# Patient Record
Sex: FEMALE | Race: AMERICAN INDIAN OR ALASKA NATIVE | NOT HISPANIC OR LATINO | Employment: UNEMPLOYED | URBAN - METROPOLITAN AREA
[De-identification: names, ages, dates, MRNs, and addresses within clinical notes are randomized per-mention and may not be internally consistent; named-entity substitution may affect disease eponyms.]

---

## 2021-05-06 ENCOUNTER — PATIENT MESSAGE (OUTPATIENT)
Dept: RESEARCH | Facility: HOSPITAL | Age: 51
End: 2021-05-06

## 2022-10-28 PROBLEM — E66.01 MORBID OBESITY: Status: ACTIVE | Noted: 2022-10-28

## 2022-10-28 PROBLEM — I48.19 PERSISTENT ATRIAL FIBRILLATION: Status: ACTIVE | Noted: 2022-10-28

## 2025-05-24 ENCOUNTER — OFFICE VISIT (OUTPATIENT)
Dept: URGENT CARE | Facility: CLINIC | Age: 55
End: 2025-05-24
Payer: MEDICAID

## 2025-05-24 VITALS
BODY MASS INDEX: 50.02 KG/M2 | HEART RATE: 58 BPM | DIASTOLIC BLOOD PRESSURE: 59 MMHG | WEIGHT: 293 LBS | TEMPERATURE: 98 F | HEIGHT: 64 IN | SYSTOLIC BLOOD PRESSURE: 100 MMHG | RESPIRATION RATE: 19 BRPM | OXYGEN SATURATION: 97 %

## 2025-05-24 DIAGNOSIS — S60.221A CONTUSION OF RIGHT HAND, INITIAL ENCOUNTER: ICD-10-CM

## 2025-05-24 DIAGNOSIS — S69.91XA INJURY OF RIGHT HAND, INITIAL ENCOUNTER: Primary | ICD-10-CM

## 2025-05-24 DIAGNOSIS — T78.40XA ALLERGIC REACTION, INITIAL ENCOUNTER: ICD-10-CM

## 2025-05-24 PROCEDURE — 73130 X-RAY EXAM OF HAND: CPT | Mod: RT,S$GLB,, | Performed by: RADIOLOGY

## 2025-05-24 PROCEDURE — 99204 OFFICE O/P NEW MOD 45 MIN: CPT | Mod: S$GLB,,, | Performed by: FAMILY MEDICINE

## 2025-05-24 RX ORDER — TORSEMIDE 10 MG/1
10 TABLET ORAL
COMMUNITY

## 2025-05-24 RX ORDER — TIRZEPATIDE 7.5 MG/.5ML
INJECTION, SOLUTION SUBCUTANEOUS
COMMUNITY
Start: 2024-07-15

## 2025-05-24 RX ORDER — MOMETASONE FUROATE 1 MG/G
CREAM TOPICAL 2 TIMES DAILY
Qty: 30 G | Refills: 0 | Status: SHIPPED | OUTPATIENT
Start: 2025-05-24

## 2025-05-24 RX ORDER — CELECOXIB 200 MG/1
2 CAPSULE ORAL EVERY MORNING
COMMUNITY

## 2025-05-24 RX ORDER — NAPROXEN 500 MG/1
500 TABLET ORAL 2 TIMES DAILY WITH MEALS
Qty: 20 TABLET | Refills: 0 | Status: SHIPPED | OUTPATIENT
Start: 2025-05-24

## 2025-05-24 RX ORDER — HYDROXYZINE HYDROCHLORIDE 25 MG/1
25 TABLET, FILM COATED ORAL EVERY 6 HOURS PRN
Qty: 20 TABLET | Refills: 0 | Status: SHIPPED | OUTPATIENT
Start: 2025-05-24

## 2025-05-24 NOTE — LETTER
May 24, 2025  Haily Pierre  [address] [place] [address]  [place] [address]                [facility] [place]  [address] [facility] [address]  [place] [address]  Phone: [phone]  Fax: [phone] Haily Pierre was seen and treated in our Urgent Care department on 5/24/2025. She may return to work in 2 - 3 days.      If you have any questions or concerns, please don't hesitate to call.        Sincerely,        Attila Valdivia MD

## 2025-05-24 NOTE — PROGRESS NOTES
"Subjective:      Patient ID: Haily Pierre is a 55 y.o. female.    Vitals:  height is 5' 4" (1.626 m) and weight is 136.1 kg (300 lb). Her oral temperature is 98.3 °F (36.8 °C). Her blood pressure is 100/59 (abnormal) and her pulse is 58 (abnormal). Her respiration is 19 and oxygen saturation is 97%.     Chief Complaint: Hand Injury    Pt states she smashed her right hand in between the door and her wheelchair handle.    Hand Injury   Her dominant hand is their right hand. Incident onset: 3-4 days ago. The incident occurred at home. The injury mechanism was a direct blow. The pain is present in the right fingers and right hand. The quality of the pain is described as aching. The pain does not radiate. The pain is at a severity of 8/10. The pain is moderate. Pertinent negatives include no numbness or tingling. The symptoms are aggravated by palpation. Treatments tried: hot water. The treatment provided no relief.   Rash  This is a new problem. Episode onset: 3-4 days ago. The problem has been gradually worsening since onset. The affected locations include the left arm, right arm and neck. The rash is characterized by redness and itchiness. Associated with: possible food. Pertinent negatives include no diarrhea, fever, sore throat or vomiting. Treatments tried: antibacterial soap. The treatment provided mild relief.       Constitution: Negative. Negative for fever.   HENT: Negative.  Negative for sore throat.    Cardiovascular: Negative.    Eyes: Negative.    Respiratory: Negative.     Gastrointestinal: Negative.  Negative for vomiting and diarrhea.   Endocrine: negative.   Genitourinary: Negative.    Musculoskeletal: Negative.    Skin:  Positive for rash and erythema.   Allergic/Immunologic: Negative.    Neurological: Negative.  Negative for numbness.   Hematologic/Lymphatic: Negative.    Psychiatric/Behavioral: Negative.        Objective:     Physical Exam   Constitutional: She is oriented to person, place, and " time. She appears well-developed. She is cooperative.  Non-toxic appearance. She does not appear ill. No distress.   HENT:   Head: Normocephalic and atraumatic. Head is without abrasion, without contusion and without laceration.   Ears:   Right Ear: Hearing, tympanic membrane, external ear and ear canal normal. No hemotympanum.   Left Ear: Hearing, tympanic membrane, external ear and ear canal normal. No hemotympanum.   Nose: Nose normal. No mucosal edema, rhinorrhea or nasal deformity. No epistaxis. Right sinus exhibits no maxillary sinus tenderness and no frontal sinus tenderness. Left sinus exhibits no maxillary sinus tenderness and no frontal sinus tenderness.   Mouth/Throat: Uvula is midline, oropharynx is clear and moist and mucous membranes are normal. No trismus in the jaw. Normal dentition. No uvula swelling. No posterior oropharyngeal erythema.   Eyes: Conjunctivae, EOM and lids are normal. Pupils are equal, round, and reactive to light. Right eye exhibits no discharge. Left eye exhibits no discharge. No scleral icterus.   Neck: Trachea normal and phonation normal. Neck supple. No tracheal deviation present. No neck rigidity present. No spinous process tenderness present. No muscular tenderness present.   Cardiovascular: Normal rate, regular rhythm, normal heart sounds and normal pulses.   Pulmonary/Chest: Effort normal and breath sounds normal. No stridor. No respiratory distress.   Abdominal: Normal appearance and bowel sounds are normal. She exhibits no distension and no mass. Soft. There is no abdominal tenderness.   Musculoskeletal: Normal range of motion.         General: No deformity. Normal range of motion.      Right hand: She exhibits tenderness.        Hands:    Neurological: She is alert and oriented to person, place, and time. She has normal strength. No cranial nerve deficit or sensory deficit. She exhibits normal muscle tone. She displays no seizure activity. Coordination normal. GCS eye  subscore is 4. GCS verbal subscore is 5. GCS motor subscore is 6.   Skin: Skin is warm, dry, intact, not diaphoretic, not pale, rash and papular. Capillary refill takes less than 2 seconds. erythema No abrasion, No burn, No bruising and No ecchymosis        Psychiatric: Her speech is normal and behavior is normal. Judgment and thought content normal.   Nursing note and vitals reviewed.    Type of Interpretation: ED Physician (Independently Interpreted).  Radiology Procedure Done: Right Hand.  Interpretation: No fx seen, possible old foreign body noted.            Assessment:     1. Injury of right hand, initial encounter    2. Allergic reaction, initial encounter    3. Contusion of right hand, initial encounter        Plan:       Injury of right hand, initial encounter  -     XR HAND COMPLETE 3 VIEW RIGHT; Future; Expected date: 05/24/2025    Allergic reaction, initial encounter  -     hydrOXYzine HCL (ATARAX) 25 MG tablet; Take 1 tablet (25 mg total) by mouth every 6 (six) hours as needed for Itching.  Dispense: 20 tablet; Refill: 0  -     mometasone 0.1% (ELOCON) 0.1 % cream; Apply topically 2 (two) times a day.  Dispense: 30 g; Refill: 0    Contusion of right hand, initial encounter  -     naproxen (NAPROSYN) 500 MG tablet; Take 1 tablet (500 mg total) by mouth 2 (two) times daily with meals.  Dispense: 20 tablet; Refill: 0      Please drink plenty of fluids.  Please get plenty of rest.  Please return here or go to the Emergency Department for any concerns or worsening of condition.  If you were prescribed a narcotic medication, do not drive or operate heavy equipment or machinery while taking these medications.  If you were not prescribed an anti-inflammatory medication, and if you do not have any history of stomach/intestinal ulcers, or kidney disease, or are not taking a blood thinner such as Coumadin, Plavix, Pradaxa, Eloquis, or Xaralta for example, it is OK to take over the counter Ibuprofen or Advil or  Motrin or Aleve as directed.  Do not take these medications on an empty stomach.  Rest, ice, compression and elevation to the affected joint or limb as needed.    If you were given a sling, wear it for comfort until follow up as arranged.  If you were given or placed in a splint, wear it until your follow up visit or recheck.  If you  smoke, please stop smoking.       Please follow up with your primary care doctor or specialist as needed.    No, Primary Doctor  None    You must understand that you have received treatment at an Urgent Care facility only, and that you may be  released before all of your medical problems are known or treated. Urgent Care facilities are not equipped to  handle life threatening emergencies. It is recommended that you seek care at an Emergency Department for  further evaluation of worsening or concerning symptoms, or possibly life threatening conditions as  discussed.    Please drink plenty of fluids.  Please get plenty of rest.  Please return here or go to the Emergency Department for any concerns or worsening of condition.  If you were prescribed a narcotic medication, do not drive or operate heavy equipment or machinery while taking these medications.  Please take over the counter Pepcid or Zantac as directed for the next 24-72hours as needed.  If you were given a steroid shot in the clinic and have also been given a prescription for a steroid such as Prednisone or a Medrol Dose Pack, please begin taking them tomorrow.  If you have a localized reaction it is ok to apply topical Benadryl and/or Cortaid as directed to the affected area.  Please take over the counter Benadryl as directed for the next 24-72 hours as needed for itching unless it makes you sleepy, then you can take over the counter Allegra or Claritin or Zyrtec as directed during the daytime hours as these generally do not cause drowsiness.    Hydrocortisone cream 1% over the counter to face.  Do not put stronger Steroid  cream on face.      If you  smoke, please stop smoking.    Please follow up with your primary care doctor or specialist as needed.    No, Primary Doctor  None    You must understand that you have received treatment at an Urgent Care facility only, and that you may be  released before all of your medical problems are known or treated. Urgent Care facilities are not equipped to  handle life threatening emergencies. It is recommended that you seek care at an Emergency Department for  further evaluation of worsening or concerning symptoms, or possibly life threatening conditions as  discussed.

## 2025-05-24 NOTE — PATIENT INSTRUCTIONS
Please drink plenty of fluids.  Please get plenty of rest.  Please return here or go to the Emergency Department for any concerns or worsening of condition.  If you were prescribed a narcotic medication, do not drive or operate heavy equipment or machinery while taking these medications.  If you were not prescribed an anti-inflammatory medication, and if you do not have any history of stomach/intestinal ulcers, or kidney disease, or are not taking a blood thinner such as Coumadin, Plavix, Pradaxa, Eloquis, or Xaralta for example, it is OK to take over the counter Ibuprofen or Advil or Motrin or Aleve as directed.  Do not take these medications on an empty stomach.  Rest, ice, compression and elevation to the affected joint or limb as needed.    If you were given a sling, wear it for comfort until follow up as arranged.  If you were given or placed in a splint, wear it until your follow up visit or recheck.  If you  smoke, please stop smoking.       Please follow up with your primary care doctor or specialist as needed.    No, Primary Doctor  None    You must understand that you have received treatment at an Urgent Care facility only, and that you may be  released before all of your medical problems are known or treated. Urgent Care facilities are not equipped to  handle life threatening emergencies. It is recommended that you seek care at an Emergency Department for  further evaluation of worsening or concerning symptoms, or possibly life threatening conditions as  discussed.    Please drink plenty of fluids.  Please get plenty of rest.  Please return here or go to the Emergency Department for any concerns or worsening of condition.  If you were prescribed a narcotic medication, do not drive or operate heavy equipment or machinery while taking these medications.  Please take over the counter Pepcid or Zantac as directed for the next 24-72hours as needed.  If you were given a steroid shot in the clinic and have also  been given a prescription for a steroid such as Prednisone or a Medrol Dose Pack, please begin taking them tomorrow.  If you have a localized reaction it is ok to apply topical Benadryl and/or Cortaid as directed to the affected area.  Please take the prescribed antihistamine (atarax)  as directed for the next 24-72 hours as needed for itching unless it makes you sleepy, then you can take over the counter Allegra or Claritin or Zyrtec as directed during the daytime hours as these generally do not cause drowsiness.    Hydrocortisone cream 1% over the counter to face.  Do not put stronger Steroid cream on face.    If you  smoke, please stop smoking.    Please follow up with your primary care doctor or specialist as needed.    No, Primary Doctor  None    You must understand that you have received treatment at an Urgent Care facility only, and that you may be  released before all of your medical problems are known or treated. Urgent Care facilities are not equipped to  handle life threatening emergencies. It is recommended that you seek care at an Emergency Department for  further evaluation of worsening or concerning symptoms, or possibly life threatening conditions as  discussed.    Generalized Allergic Reaction (Other)  You are having an allergic reaction. Almost anything can cause one. Different people are allergic to different things. It is usually something that you ate or swallowed, came into contact with by getting or putting it on your skin or clothes, or something you breathed in the air. This can be very annoying and sometimes scary.  Most of us think of allergic reactions when we have a rash or itchy skin. Symptoms can include:  Rash, hives, redness, welts, blisters  Itching, burning, stinging, pain  Dry, flaky, cracking, scaly skin  Swelling of the face, lips or other parts of the body  Hoarse voice  Trouble swallowing, feeling like your throat is closing  Trouble breathing, wheezing  Nausea, vomiting,  diarrhea, stomach cramps  Feeling faint or lightheaded, rapid heart rate  Sometimes the cause may be obvious. However, there are so many things that can cause a reaction that you may not be able to figure out. The most important things to help find your allergen are:  Remembering when it started  What you were doing at the time or just before that  Any activities you were involved in  Any new products or contacts  Here are some common causes, but remember almost anything can cause a reaction, and you may not even be aware that you came into contact with one of these things.  Dust, mold, pollen  Plants, such aspoison ivy and poison oak are common ones, but there are many others  Animals  Foods such as shrimp, shellfish, peanuts, milk products, gluten, eggs; also colorings, flavorings, additives  Insect bites or stings such as bees, mosquitos, flees, ticks  Medicines such as penicillin, sulfa drugs, amoxicillin, aspirin, ibuprofen; any medicine can cause a reaction  Jewelry such as nickel, gold (new, or something youve worn for a while including zippers, and buttons)  Latex such as in gloves, clothes, toys, balloons, or some tapes (some people allergic to latex may also have problems with foods like bananas, avocados, kiwi, papaya, or chestnuts)  Lotions, perfumes, cosmetics, soaps, shampoos, skincare products, nail products  Chemicals or dyes in clothing, linen, , hair dyes, soaps, iodine  Many viruses and common colds can cause a rash, but is not an allergic reaction. Sometimes it is hard to tell the difference between allergies, sensitivity and intolerance to something. This is especially true with food. Many things can cause diarrhea, vomiting, stomach cramps, and skin irritation.  Home care    The goal of our treatment is to help relieve the symptoms, and get you feeling better. The rash will usually fade over several days, but can sometimes last a couple of weeks. Over the next couple of days, there may  be times when it is gets a little worse, and then better again. Here are some things to do:  If you know what you are allergic to, avoid it because future reactions could be worse than this one.  Avoid tight clothing and anything that heats up your skin (hot showers/baths, direct sunlight) since heat will make itching worse.  An ice pack will relieve local areas of intense itching and redness. Dont put the ice directly on the skin, because it can damage the skin. You can also ice put it in a plastic bag. Wrap it in something like a thin towel, guanakito shirt, or cloth, or use a bag of frozen peas.  Oral Benadryl (diphenhydramine) is an antihistamine available at drug and grocery stores. Unless a prescription antihistamine was given, Benadryl may be used to reduce itching if large areas of the skin are involved. It may make you sleepy, so be careful using it in the daytime or when going to school, working, or driving. [NOTE: Do not use Benadryl if you have glaucoma or if you are a man with trouble urinating due to an enlarged prostate.] There are antihistamines that causes less drowsiness and are good alternatives for daytime use. Ask your pharmacist for suggestions.  Do not use Benadryl cream on your skin, because in some people it can cause a further reaction, and make you allergic to Benadryl.  Try not to scratch. This can tear the skin and cause an infection.  Using heat-steam to clean your home, using high-efficiency particulate (HEPA) vacuums and filters, avoiding food and pet triggers, exterminating cockroaches, and frequent house cleaning are a few of the strategies used to decrease allergic reactions.  Follow-up care  Follow up with your healthcare provider, or as advised. If you had a severe reaction today, or if you have had several mild-moderate allergic reactions in the past, ask your doctor about allergy testing to find out what you are allergic to. If your reaction included dizziness, fainting or trouble  breathing or swallowing, ask your doctor about carrying injectable epinephrine for home use.  Call 911  Call 911 if any of these occur:  Trouble breathing or swallowing, wheezing  Hoarse voice or trouble speaking  Confused  Very drowsy or trouble awakening  Fainting or loss of consciousness  Rapid heart rate  Low blood pressure  Feeling of doom  Nausea, vomiting, abdominal pain, diarrhea  Vomiting blood, or large amounts of blood in stool  Seizure  When to seek medical advice  Call your healthcare provider right away if any of these occur:  Spreading areas of itching, redness or swelling  New or worse swelling in the face, eyelids, lips, mouth, throat or tongue  Dizziness, weakness  Signs of infection:  Spreading redness  Increased pain or swelling  Fever (1 degree above your normal temperature) lasting for 24 to 48 hours  Date Last Reviewed: 7/30/2015  © 1202-8430 [employer]. [address] [place] [address], [place] [address]. All rights reserved. This information is not intended as a substitute for professional medical care. Always follow your healthcare professional's instructions.